# Patient Record
Sex: FEMALE | Race: WHITE | NOT HISPANIC OR LATINO | Employment: STUDENT | URBAN - METROPOLITAN AREA
[De-identification: names, ages, dates, MRNs, and addresses within clinical notes are randomized per-mention and may not be internally consistent; named-entity substitution may affect disease eponyms.]

---
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
## 2021-01-18 ENCOUNTER — TELEPHONE (OUTPATIENT)
Dept: FAMILY MEDICINE CLINIC | Facility: HOSPITAL | Age: 21
End: 2021-01-18

## 2021-01-18 NOTE — TELEPHONE ENCOUNTER
Pt woke up this morning with uti symptoms  Has pain, burning and urgency  Was treated for this recently and is asking to have something called in for her  Would you be ok with her dropping a sample off for us to dip?

## 2021-01-18 NOTE — TELEPHONE ENCOUNTER
Pt states she isn't sure if she should be coming to the office to drop off a urine sample because she isn't feeling well  Fever and fatigue  Please advise on how you want to proceed

## 2022-06-20 ENCOUNTER — TELEPHONE (OUTPATIENT)
Dept: FAMILY MEDICINE CLINIC | Facility: HOSPITAL | Age: 22
End: 2022-06-20

## 2022-06-21 ENCOUNTER — TELEPHONE (OUTPATIENT)
Dept: FAMILY MEDICINE CLINIC | Facility: HOSPITAL | Age: 22
End: 2022-06-21

## 2022-06-22 ENCOUNTER — TELEPHONE (OUTPATIENT)
Dept: FAMILY MEDICINE CLINIC | Facility: HOSPITAL | Age: 22
End: 2022-06-22

## 2022-06-23 NOTE — TELEPHONE ENCOUNTER
3 phone calls made to patient, unanswered  As she was seen within 3 years, certified letters not sent  Postcard mailed advising to contact the office to schedule an appointment or let us know if she has switched out of the practice

## 2022-11-01 ENCOUNTER — OFFICE VISIT (OUTPATIENT)
Dept: FAMILY MEDICINE CLINIC | Facility: HOSPITAL | Age: 22
End: 2022-11-01

## 2022-11-01 VITALS
SYSTOLIC BLOOD PRESSURE: 112 MMHG | DIASTOLIC BLOOD PRESSURE: 64 MMHG | WEIGHT: 113.2 LBS | HEART RATE: 85 BPM | OXYGEN SATURATION: 98 % | BODY MASS INDEX: 19.33 KG/M2 | HEIGHT: 64 IN | TEMPERATURE: 98.6 F

## 2022-11-01 DIAGNOSIS — R39.9 UTI SYMPTOMS: Primary | ICD-10-CM

## 2022-11-01 LAB
SL AMB  POCT GLUCOSE, UA: ABNORMAL
SL AMB LEUKOCYTE ESTERASE,UA: ABNORMAL
SL AMB POCT BILIRUBIN,UA: ABNORMAL
SL AMB POCT BLOOD,UA: ABNORMAL
SL AMB POCT CLARITY,UA: CLEAR
SL AMB POCT COLOR,UA: YELLOW
SL AMB POCT KETONES,UA: ABNORMAL
SL AMB POCT NITRITE,UA: ABNORMAL
SL AMB POCT PH,UA: 6
SL AMB POCT SPECIFIC GRAVITY,UA: 1.02
SL AMB POCT URINE PROTEIN: ABNORMAL
SL AMB POCT UROBILINOGEN: ABNORMAL

## 2022-11-01 RX ORDER — TRAZODONE HYDROCHLORIDE 50 MG/1
50 TABLET ORAL AS NEEDED
COMMUNITY
Start: 2022-09-26

## 2022-11-01 RX ORDER — BUSPIRONE HYDROCHLORIDE 10 MG/1
10 TABLET ORAL DAILY
COMMUNITY
Start: 2022-10-28

## 2022-11-01 RX ORDER — LAMOTRIGINE 150 MG/1
150 TABLET ORAL
COMMUNITY
Start: 2022-10-22

## 2022-11-01 RX ORDER — LORAZEPAM 0.5 MG/1
0.5 TABLET ORAL AS NEEDED
COMMUNITY
Start: 2022-10-22

## 2022-11-01 NOTE — PROGRESS NOTES
Name: Reena Layton      : 2000      MRN: [id number]  Encounter Provider: TOR Garcia  Encounter Date: 2022   Encounter department: [facility] [address] [facility]  UTI symptoms  Comments:  in office dip normal & sx's improved so will defer antibiotic at this time; urine cx sent for further eval & will determine plan pending result; push fluids  Orders:  -     POCT urine dip  -     Urine culture; Future  -     Urine culture           Subjective        States "I believe I have a UTI"  [facility]  Urine was not cultured at that time  Didn't finish prescribed abx  Started with burning, pain with urination and frequency 4 days ago  Started taking AZO 4 days ago and helped stop the burning and frequency  Feels better today  Reports chills 2 days ago and 1 day of "hot flashes"  Did not take tempurature  Feels fatigued       Review of Systems   Constitutional: Positive for chills, fatigue and fever  Negative for activity change  Gastrointestinal: Negative for abdominal distention and abdominal pain  Genitourinary: Positive for dysuria, frequency and urgency  Negative for decreased urine volume, difficulty urinating and hematuria  Musculoskeletal: Positive for back pain (lower back pain on and off)  Neurological: Negative for dizziness, weakness and light-headedness         Current Outpatient Medications on File Prior to Visit   Medication Sig   • busPIRone (BUSPAR) 10 mg tablet Take 10 mg by mouth daily   • lamoTRIgine (LaMICtal) 150 MG tablet 150 mg   • LORazepam (ATIVAN) 0 5 mg tablet Take 0 5 mg by mouth if needed   • traZODone (DESYREL) 50 mg tablet Take 50 mg by mouth if needed Take 1 to 2 tablets by mouth at bedtime as needed for sleep   • amphetamine-dextroamphetamine (ADDERALL) 20 mg tablet  (Patient not taking: Reported on 2022)   • ARIPiprazole (ABILIFY) 10 mg tablet  (Patient not taking: Reported on 11/1/2022)   • ARIPiprazole (ABILIFY) 2 mg tablet STOP ARIPIRPAZOLE 10 MG  TAKE 1 TABLET BY MOUTH AT NIGHT  (Patient not taking: Reported on 11/1/2022)   • benztropine (COGENTIN) 1 mg tablet TAKE ONE DAILY IF YOU GET MUSCLE STIFFNESS OR SHAKING FROM ARIPIPRAZOLE  (Patient not taking: Reported on 11/1/2022)   • diclofenac (VOLTAREN) 75 mg EC tablet Take 1 tablet (75 mg total) by mouth 2 (two) times a day (Patient not taking: Reported on 11/1/2022)   • EPINEPHrine (EPIPEN) 0 3 mg/0 3 mL SOAJ Inject 0 3 mg into a muscle (Patient not taking: Reported on 11/1/2022)   • escitalopram (LEXAPRO) 5 mg tablet Take 5 mg by mouth daily (Patient not taking: Reported on 11/1/2022)   • hydrOXYzine HCL (ATARAX) 25 mg tablet Take 25 mg by mouth Three times daily as needed (Patient not taking: Reported on 11/1/2022)   • hydrOXYzine pamoate (VISTARIL) 25 mg capsule  (Patient not taking: Reported on 11/1/2022)   • NuvaRing 0 12-0 015 MG/24HR vaginal ring INSERT 1 RING VAGINALLY AS DIRECTED  REMOVE AFTER 3 WEEKS & WAIT 7 DAYS BEFORE INSERTING A NEW RING (Patient not taking: Reported on 11/1/2022)       Objective     /64   Pulse 85   Temp 98 6 °F (37 °C)   Ht 5' 3 5" (1 613 m)   Wt 51 3 kg (113 lb 3 2 oz)   SpO2 98%   BMI 19 74 kg/m²       Physical Exam  Vitals reviewed  Constitutional:       Appearance: Normal appearance  HENT:      Head: Normocephalic  Nose: Nose normal    Pulmonary:      Effort: Pulmonary effort is normal  No respiratory distress  Abdominal:      General: Abdomen is flat  Palpations: Abdomen is soft  Tenderness: There is no abdominal tenderness  There is no right CVA tenderness, left CVA tenderness (slight tenderness) or guarding  Musculoskeletal:      Cervical back: Normal range of motion  Skin:     General: Skin is warm and dry  Neurological:      General: No focal deficit present  Mental Status: She is alert and oriented to person, place, and time  Psychiatric:         Mood and Affect: Mood normal          Behavior: Behavior normal           TOR Ríos

## 2023-01-31 ENCOUNTER — OFFICE VISIT (OUTPATIENT)
Dept: FAMILY MEDICINE CLINIC | Facility: HOSPITAL | Age: 23
End: 2023-01-31

## 2023-01-31 VITALS
DIASTOLIC BLOOD PRESSURE: 68 MMHG | HEIGHT: 64 IN | BODY MASS INDEX: 20.18 KG/M2 | HEART RATE: 80 BPM | TEMPERATURE: 98.9 F | SYSTOLIC BLOOD PRESSURE: 112 MMHG | WEIGHT: 118.2 LBS

## 2023-01-31 DIAGNOSIS — F31.76 BIPOLAR DISORDER, IN FULL REMISSION, MOST RECENT EPISODE DEPRESSED (HCC): Primary | ICD-10-CM

## 2023-01-31 DIAGNOSIS — Z01.419 ROUTINE GYNECOLOGICAL EXAMINATION: ICD-10-CM

## 2023-01-31 DIAGNOSIS — F99 INSOMNIA DUE TO OTHER MENTAL DISORDER: ICD-10-CM

## 2023-01-31 DIAGNOSIS — F51.05 INSOMNIA DUE TO OTHER MENTAL DISORDER: ICD-10-CM

## 2023-01-31 RX ORDER — TRAZODONE HYDROCHLORIDE 50 MG/1
50 TABLET ORAL AS NEEDED
Qty: 30 TABLET | Refills: 2 | Status: SHIPPED | OUTPATIENT
Start: 2023-01-31 | End: 2023-02-09 | Stop reason: SDUPTHER

## 2023-01-31 NOTE — ASSESSMENT & PLAN NOTE
Mood stable on meds as previously rx'd by psychiatry  Per pt's request, I agreed to issue med refills given mood stability and adequate benefit  Discussed need to reincorporate psych as needed should meds need adjusting/changing - she voices understanding  Return in 3 months for next med check

## 2023-01-31 NOTE — PROGRESS NOTES
Name: Alicia Flowers      : 2000      MRN: [id number]  Encounter Provider: TOR Tarango  Encounter Date: 2023   [facility]  Bipolar disorder, in full remission, most recent episode depressed ([facility] [address] )  Assessment & Plan:  Mood stable on meds as previously rx'd by psychiatry  Per pt's request, I agreed to issue med refills given mood stability and adequate benefit  Discussed need to reincorporate psych as needed should meds need adjusting/changing - she voices understanding  Return in 3 months for next med check      2  Routine gynecological examination  Comments:  establish w/gyn to update exam and pap smear  Orders:  -     Ambulatory Referral to Gynecology; Future    3  Insomnia due to other mental disorder  Assessment & Plan:  Adequate benefit w/trazodone - refill issued as she requests    Orders:  -     traZODone (DESYREL) 50 mg tablet; Take 1 tablet (50 mg total) by mouth if needed for sleep Take 1 to 2 tablets by mouth at bedtime as needed for sleep         Subjective      Is interested in having mental health meds refilled through our office  Was having difficulty getting refills through previous provider and would have lapse in meds  Had been seeing a counselor at [facility] and was diagnosed with bipolar by a psychiatrist in 2019  Has been on current meds for about a year  Lorazepam was rx'd recently to use for panic attacks  States mood has been stable overall on current meds  Has some increase in situational stress but denies need for changing meds or dose  Review of Systems   Psychiatric/Behavioral: Negative for dysphoric mood and sleep disturbance (sleeps well w/trazodone prn)  The patient is not nervous/anxious          Current Outpatient Medications on File Prior to Visit   Medication Sig   • busPIRone (BUSPAR) 10 mg tablet Take 10 mg by mouth daily   • EPINEPHrine (EPIPEN) 0 3 mg/0 3 mL SOAJ Inject 0 3 mg into a muscle   • lamoTRIgine (LaMICtal) 150 MG tablet 150 mg   • LORazepam (ATIVAN) 0 5 mg tablet Take 0 5 mg by mouth if needed   • [DISCONTINUED] traZODone (DESYREL) 50 mg tablet Take 50 mg by mouth if needed Take 1 to 2 tablets by mouth at bedtime as needed for sleep   • [DISCONTINUED] amphetamine-dextroamphetamine (ADDERALL) 20 mg tablet  (Patient not taking: Reported on 11/1/2022)   • [DISCONTINUED] ARIPiprazole (ABILIFY) 10 mg tablet  (Patient not taking: Reported on 11/1/2022)   • [DISCONTINUED] ARIPiprazole (ABILIFY) 2 mg tablet STOP ARIPIRPAZOLE 10 MG  TAKE 1 TABLET BY MOUTH AT NIGHT  (Patient not taking: Reported on 11/1/2022)   • [DISCONTINUED] benztropine (COGENTIN) 1 mg tablet TAKE ONE DAILY IF YOU GET MUSCLE STIFFNESS OR SHAKING FROM ARIPIPRAZOLE  (Patient not taking: Reported on 11/1/2022)   • [DISCONTINUED] diclofenac (VOLTAREN) 75 mg EC tablet Take 1 tablet (75 mg total) by mouth 2 (two) times a day (Patient not taking: Reported on 11/1/2022)   • [DISCONTINUED] escitalopram (LEXAPRO) 5 mg tablet Take 5 mg by mouth daily (Patient not taking: Reported on 11/1/2022)   • [DISCONTINUED] hydrOXYzine HCL (ATARAX) 25 mg tablet Take 25 mg by mouth Three times daily as needed (Patient not taking: Reported on 11/1/2022)   • [DISCONTINUED] hydrOXYzine pamoate (VISTARIL) 25 mg capsule  (Patient not taking: Reported on 11/1/2022)   • [DISCONTINUED] NuvaRing 0 12-0 015 MG/24HR vaginal ring INSERT 1 RING VAGINALLY AS DIRECTED  REMOVE AFTER 3 WEEKS & WAIT 7 DAYS BEFORE INSERTING A NEW RING (Patient not taking: Reported on 11/1/2022)       Objective     /68   Pulse 80   Temp 98 9 °F (37 2 °C)   Ht 5' 3 5" (1 613 m)   Wt 53 6 kg (118 lb 3 2 oz)   BMI 20 61 kg/m²       Physical Exam  Vitals reviewed  Constitutional:       General: She is not in acute distress  Appearance: Normal appearance  Eyes:      General: No scleral icterus    Pulmonary:      Effort: Pulmonary effort is normal  No respiratory distress  Neurological:      General: No focal deficit present  Mental Status: She is alert and oriented to person, place, and time  Psychiatric:         Mood and Affect: Mood normal          Behavior: Behavior normal          Thought Content:  Thought content normal          Judgment: Judgment normal       Comments: Relaxed, freely conversive w/good eye contact          TOR Boyce

## 2023-03-23 DIAGNOSIS — F99 INSOMNIA DUE TO OTHER MENTAL DISORDER: ICD-10-CM

## 2023-03-23 DIAGNOSIS — F51.05 INSOMNIA DUE TO OTHER MENTAL DISORDER: ICD-10-CM

## 2023-03-23 RX ORDER — TRAZODONE HYDROCHLORIDE 50 MG/1
TABLET ORAL
Qty: 180 TABLET | Refills: 1 | Status: SHIPPED | OUTPATIENT
Start: 2023-03-23

## 2023-03-31 DIAGNOSIS — F31.76 BIPOLAR DISORDER, IN FULL REMISSION, MOST RECENT EPISODE DEPRESSED (HCC): Primary | ICD-10-CM

## 2023-03-31 RX ORDER — LAMOTRIGINE 150 MG/1
150 TABLET ORAL DAILY
Qty: 30 TABLET | Refills: 1 | Status: SHIPPED | OUTPATIENT
Start: 2023-03-31

## 2023-04-23 DIAGNOSIS — F31.76 BIPOLAR DISORDER, IN FULL REMISSION, MOST RECENT EPISODE DEPRESSED (HCC): ICD-10-CM

## 2023-04-24 RX ORDER — LAMOTRIGINE 150 MG/1
TABLET ORAL
Qty: 90 TABLET | Refills: 1 | Status: SHIPPED | OUTPATIENT
Start: 2023-04-24

## 2023-05-01 ENCOUNTER — OFFICE VISIT (OUTPATIENT)
Dept: FAMILY MEDICINE CLINIC | Facility: HOSPITAL | Age: 23
End: 2023-05-01

## 2023-05-01 VITALS
SYSTOLIC BLOOD PRESSURE: 114 MMHG | BODY MASS INDEX: 20.66 KG/M2 | HEART RATE: 81 BPM | WEIGHT: 121 LBS | DIASTOLIC BLOOD PRESSURE: 70 MMHG | HEIGHT: 64 IN | TEMPERATURE: 97.8 F

## 2023-05-01 DIAGNOSIS — F99 INSOMNIA DUE TO OTHER MENTAL DISORDER: ICD-10-CM

## 2023-05-01 DIAGNOSIS — Z00.00 ANNUAL PHYSICAL EXAM: Primary | ICD-10-CM

## 2023-05-01 DIAGNOSIS — F98.8 ATTENTION DEFICIT DISORDER (ADD) WITHOUT HYPERACTIVITY: ICD-10-CM

## 2023-05-01 DIAGNOSIS — F31.76 BIPOLAR DISORDER, IN FULL REMISSION, MOST RECENT EPISODE DEPRESSED (HCC): ICD-10-CM

## 2023-05-01 DIAGNOSIS — F51.05 INSOMNIA DUE TO OTHER MENTAL DISORDER: ICD-10-CM

## 2023-05-01 PROBLEM — R11.2 NAUSEA & VOMITING: Status: RESOLVED | Noted: 2019-06-26 | Resolved: 2023-05-01

## 2023-05-01 RX ORDER — LAMOTRIGINE 150 MG/1
150 TABLET ORAL DAILY
Qty: 90 TABLET | Refills: 1 | OUTPATIENT
Start: 2023-05-01

## 2023-05-01 RX ORDER — LORAZEPAM 0.5 MG/1
0.5 TABLET ORAL DAILY PRN
Qty: 15 TABLET | Refills: 0 | Status: SHIPPED | OUTPATIENT
Start: 2023-05-01

## 2023-05-01 NOTE — PROGRESS NOTES
ADULT ANNUAL PHYSICAL  [facility] PRIMARY CARE [address]     NAME: Adwoa Espinoza  AGE: 25 y o  SEX: female  : 2000     DATE: 2023     Assessment and Plan:     Problem List Items Addressed This Visit        Other    Bipolar disorder, unspecified ([address] )     Recent acute depressive event but currently resolved and she relays mood has been stable  Will continue same lamotrigine & buspirone doses as she requests  Return in 6 months for next follow up - call sooner w/any mood concerns         Insomnia due to other mental disorder     Continue to use trazodone as needed for sleep         Attention deficit disorder (ADD) without hyperactivity     Hx of adderall use w/good benefit  She requests restart of medication as was previously beneficial (uncertain of past dose)  Discussed short supply of ADD meds and many are on backorder - advise she attempt to find pharmacy with adderall in stock and will consider rx'ing appropriate dose if she is able to find - she will contact office if needed        Other Visit Diagnoses     Annual physical exam    -  Primary    PE updated, next due in 1 year; HPV declined today, reminded her to update gyn exam/pap as previously ordered          Immunizations and preventive care screenings were discussed with patient today  Appropriate education was printed on patient's after visit summary  Counseling:  Dental Health: discussed importance of regular tooth brushing, flossing, and dental visits  Sexual health: discussed sexually transmitted diseases, partner selection, use of condoms, avoidance of unintended pregnancy, and contraceptive alternatives  · Exercise: the importance of regular exercise/physical activity was discussed  Recommend exercise 3-5 times per week for at least 30 minutes  Return in about 6 months (around 2023) for Next scheduled follow up       Chief Complaint:     Chief Complaint   Patient "presents with    Physical Exam    ADHD     Possibly going back on adderall     Ear Fullness     bilateral      History of Present Illness:     Adult Annual Physical   Patient here for a comprehensive physical exam  The patient reports problems - she questions need to restart ADD medication  Had been on previously from psych (adderall) but had to dc with not having consistent refills  Has been off >6 months       Diet and Physical Activity  · Diet/Nutrition: well balanced diet  · Exercise: no formal exercise  Depression Screening  PHQ-2/9 Depression Screening         General Health  · Sleep: sleeps well  · Hearing: normal - bilateral   · Vision: goes for regular eye exams and wears glasses  · Dental: regular dental visits  /GYN Health  · Last menstrual period: 4/2023  · Contraceptive method: none  Review of Systems:     Review of Systems   Constitutional: Negative  HENT: Positive for ear pain (fullness/pressure bilat)  Eyes: Negative  Respiratory: Negative  Cardiovascular: Negative  Gastrointestinal: Negative  Genitourinary: Negative  Musculoskeletal: Negative  Skin: Negative  Neurological: Negative  Psychiatric/Behavioral: Positive for decreased concentration (hx of taking adderall, had been more productive when she was taking, currently unable to focus to get done what she needs to), dysphoric mood (past few weeks, \"coming out of it now\"; hx of worse depression when taking antidepressant in the past) and sleep disturbance (occ, uses trazodone prn)  Negative for suicidal ideas  The patient is not nervous/anxious  Past Medical History:     Past Medical History:   Diagnosis Date    Anxiety     Depression     Insomnia     Nausea & vomiting 6/26/2019    Vitamin D deficiency       Past Surgical History:     History reviewed  No pertinent surgical history     Social History:     Social History     Socioeconomic History    Marital status: Single     " "Spouse name: None    Number of children: None    Years of education: None    Highest education level: None   Occupational History    None   Tobacco Use    Smoking status: Former    Smokeless tobacco: Never   Vaping Use    Vaping Use: Every day    Substances: Nicotine, THC   Substance and Sexual Activity    Alcohol use: Yes     Comment: vodka    Drug use: Yes     Types: Marijuana     Comment: Discontinued two weeks ago, mom not aware    Sexual activity: Yes     Birth control/protection: Condom Male   Other Topics Concern    None   Social History Narrative    None     Social Determinants of Health     Financial Resource Strain: Not on file   Food Insecurity: Not on file   Transportation Needs: Not on file   Physical Activity: Not on file   Stress: Not on file   Social Connections: Not on file   Intimate Partner Violence: Not on file   Housing Stability: Not on file      Family History:     Family History   Problem Relation Age of Onset    Colon polyps Mother     Breast cancer Mother       Current Medications:     Current Outpatient Medications   Medication Sig Dispense Refill    busPIRone (BUSPAR) 10 mg tablet Take 10 mg by mouth daily      lamoTRIgine (LaMICtal) 150 MG tablet TAKE 1 TABLET BY MOUTH EVERY DAY 90 tablet 1    LORazepam (ATIVAN) 0 5 mg tablet Take 0 5 mg by mouth if needed      traZODone (DESYREL) 50 mg tablet TAKE 1 TO 2 TABLETS BY MOUTH AT BEDTIME AS NEEDED FOR SLEEP 180 tablet 1    EPINEPHrine (EPIPEN) 0 3 mg/0 3 mL SOAJ Inject 0 3 mg into a muscle (Patient not taking: Reported on 5/1/2023)       No current facility-administered medications for this visit  Allergies: Allergies   Allergen Reactions    Apple - Food Allergy Swelling    Peanut Oil - Food Allergy       Physical Exam:     /70   Pulse 81   Temp 97 8 °F (36 6 °C)   Ht 5' 3 5\" (1 613 m)   Wt 54 9 kg (121 lb)   BMI 21 10 kg/m²       Physical Exam  Vitals reviewed     Constitutional:       General: She is " not in acute distress  Appearance: Normal appearance  HENT:      Head: Normocephalic  Right Ear: Tympanic membrane normal       Left Ear: Tympanic membrane normal       Nose: Nose normal       Mouth/Throat:      Mouth: Mucous membranes are moist       Pharynx: Oropharynx is clear  Eyes:      General: No scleral icterus  Neck:      Thyroid: No thyromegaly  Cardiovascular:      Rate and Rhythm: Normal rate and regular rhythm  Heart sounds: No murmur heard  Pulmonary:      Effort: Pulmonary effort is normal  No respiratory distress  Breath sounds: Normal breath sounds  Abdominal:      General: Abdomen is flat  Bowel sounds are normal       Palpations: Abdomen is soft  Tenderness: There is no abdominal tenderness  There is no guarding or rebound  Musculoskeletal:         General: Normal range of motion  Cervical back: Normal range of motion  Lymphadenopathy:      Cervical: No cervical adenopathy  Skin:     General: Skin is warm and dry  Neurological:      General: No focal deficit present  Mental Status: She is alert and oriented to person, place, and time  Cranial Nerves: No cranial nerve deficit  Psychiatric:         Mood and Affect: Mood normal          Behavior: Behavior normal          Thought Content:  Thought content normal          Judgment: Judgment normal         TOR Ricketts   [address]

## 2023-05-01 NOTE — ASSESSMENT & PLAN NOTE
Recent acute depressive event but currently resolved and she relays mood has been stable  Will continue same lamotrigine & buspirone doses as she requests  Return in 6 months for next follow up - call sooner w/any mood concerns

## 2023-05-01 NOTE — ASSESSMENT & PLAN NOTE
Hx of adderall use w/good benefit  She requests restart of medication as was previously beneficial (uncertain of past dose)  Discussed short supply of ADD meds and many are on backorder - advise she attempt to find pharmacy with adderall in stock and will consider rx'ing appropriate dose if she is able to find - she will contact office if needed

## 2023-10-19 DIAGNOSIS — F31.76 BIPOLAR DISORDER, IN FULL REMISSION, MOST RECENT EPISODE DEPRESSED (HCC): ICD-10-CM

## 2023-10-19 RX ORDER — LAMOTRIGINE 150 MG/1
TABLET ORAL
Qty: 90 TABLET | Refills: 1 | Status: SHIPPED | OUTPATIENT
Start: 2023-10-19

## 2023-11-02 ENCOUNTER — OFFICE VISIT (OUTPATIENT)
Dept: FAMILY MEDICINE CLINIC | Facility: HOSPITAL | Age: 23
End: 2023-11-02
Payer: COMMERCIAL

## 2023-11-02 VITALS
HEART RATE: 76 BPM | BODY MASS INDEX: 21.13 KG/M2 | SYSTOLIC BLOOD PRESSURE: 112 MMHG | HEIGHT: 64 IN | TEMPERATURE: 98.6 F | WEIGHT: 123.8 LBS | DIASTOLIC BLOOD PRESSURE: 68 MMHG

## 2023-11-02 DIAGNOSIS — F99 INSOMNIA DUE TO OTHER MENTAL DISORDER: ICD-10-CM

## 2023-11-02 DIAGNOSIS — F41.1 GENERALIZED ANXIETY DISORDER: Primary | ICD-10-CM

## 2023-11-02 DIAGNOSIS — F17.290 OTHER TOBACCO PRODUCT NICOTINE DEPENDENCE, UNCOMPLICATED: ICD-10-CM

## 2023-11-02 DIAGNOSIS — F41.9 ANXIETY: Primary | ICD-10-CM

## 2023-11-02 DIAGNOSIS — F51.05 INSOMNIA DUE TO OTHER MENTAL DISORDER: ICD-10-CM

## 2023-11-02 PROBLEM — F17.200 NICOTINE DEPENDENCE: Status: ACTIVE | Noted: 2023-11-02

## 2023-11-02 PROCEDURE — 99213 OFFICE O/P EST LOW 20 MIN: CPT | Performed by: NURSE PRACTITIONER

## 2023-11-02 RX ORDER — BUSPIRONE HYDROCHLORIDE 10 MG/1
10 TABLET ORAL DAILY
Qty: 90 TABLET | Refills: 1 | Status: SHIPPED | OUTPATIENT
Start: 2023-11-02

## 2023-11-02 NOTE — PROGRESS NOTES
Name: Lianne Menchaca      : 2000      MRN: [id number]  Encounter Provider: TOR Weinberg  Encounter Date: 2023   Encounter department: [address]     1. Anxiety  Assessment & Plan:  Well controlled on buspar  Refill issued to continue same dose as she desires  Return in 6 months - call sooner w/concerns      2. Insomnia due to other mental disorder  Assessment & Plan:  Adequate benefit w/trazodone as rx'd      3. Other tobacco product nicotine dependence, uncomplicated  Assessment & Plan:  Pt vapes regularly and is making attempts to quit  Discussed mental health risks of chantix and advise she continue to self attempt cessation      Update annual PE at next appt in 6 months  Establish w/gyn or offered my services for cervical screening       Subjective      States she has been well and denies concerns. States she ran out of buspar last week with running out of refills. This controls her anxiety well and she wishes to continue same dose. States she is trying to quit vaping and questions using chantix. Review of Systems   Psychiatric/Behavioral:  Negative for sleep disturbance. The patient is not nervous/anxious (well controlled on daily buspar, doesn't need lorazepam).         Current Outpatient Medications on File Prior to Visit   Medication Sig    lamoTRIgine (LaMICtal) 150 MG tablet TAKE 1 TABLET BY MOUTH EVERY DAY    LORazepam (ATIVAN) 0.5 mg tablet Take 1 tablet (0.5 mg total) by mouth daily as needed for anxiety    traZODone (DESYREL) 50 mg tablet TAKE 1 TO 2 TABLETS BY MOUTH AT BEDTIME AS NEEDED FOR SLEEP    [DISCONTINUED] busPIRone (BUSPAR) 10 mg tablet Take 10 mg by mouth daily    EPINEPHrine (EPIPEN) 0.3 mg/0.3 mL SOAJ Inject 0.3 mg into a muscle (Patient not taking: Reported on 2023)       Objective     /68   Pulse 76   Temp 98.6 °F (37 °C)   Ht 5' 3.5" (1.613 m)   Wt 56.2 kg (123 lb 12.8 oz)   BMI 21.59 kg/m²       Physical Exam  Vitals reviewed. Constitutional:       General: She is not in acute distress. Appearance: Normal appearance. HENT:      Head: Normocephalic. Neck:      Thyroid: No thyromegaly. Cardiovascular:      Rate and Rhythm: Normal rate. Pulmonary:      Effort: Pulmonary effort is normal. No respiratory distress. Breath sounds: Normal breath sounds. Musculoskeletal:      Cervical back: Normal range of motion. Lymphadenopathy:      Cervical: No cervical adenopathy. Skin:     General: Skin is warm and dry. Neurological:      General: No focal deficit present. Mental Status: She is alert and oriented to person, place, and time. Psychiatric:         Mood and Affect: Mood normal.         Behavior: Behavior normal.         Thought Content:  Thought content normal.         Judgment: Judgment normal.         TOR Mustafa

## 2023-11-02 NOTE — ASSESSMENT & PLAN NOTE
Pt vapes regularly and is making attempts to quit  Discussed mental health risks of chantix and advise she continue to self attempt cessation

## 2023-11-02 NOTE — ASSESSMENT & PLAN NOTE
Well controlled on buspar  Refill issued to continue same dose as she desires  Return in 6 months - call sooner w/concerns

## 2024-04-16 DIAGNOSIS — F31.76 BIPOLAR DISORDER, IN FULL REMISSION, MOST RECENT EPISODE DEPRESSED (HCC): ICD-10-CM

## 2024-04-16 RX ORDER — LAMOTRIGINE 150 MG/1
TABLET ORAL
Qty: 90 TABLET | Refills: 1 | Status: SHIPPED | OUTPATIENT
Start: 2024-04-16

## 2024-04-28 DIAGNOSIS — F41.1 GENERALIZED ANXIETY DISORDER: ICD-10-CM

## 2024-04-28 RX ORDER — BUSPIRONE HYDROCHLORIDE 10 MG/1
10 TABLET ORAL DAILY
Qty: 90 TABLET | Refills: 1 | Status: SHIPPED | OUTPATIENT
Start: 2024-04-28

## 2024-05-02 ENCOUNTER — OFFICE VISIT (OUTPATIENT)
Dept: FAMILY MEDICINE CLINIC | Facility: HOSPITAL | Age: 24
End: 2024-05-02
Payer: COMMERCIAL

## 2024-05-02 VITALS
DIASTOLIC BLOOD PRESSURE: 70 MMHG | SYSTOLIC BLOOD PRESSURE: 114 MMHG | HEART RATE: 74 BPM | TEMPERATURE: 98.1 F | BODY MASS INDEX: 21.1 KG/M2 | HEIGHT: 64 IN | WEIGHT: 123.6 LBS

## 2024-05-02 DIAGNOSIS — Z13.220 NEED FOR LIPID SCREENING: ICD-10-CM

## 2024-05-02 DIAGNOSIS — Z11.4 ENCOUNTER FOR SCREENING FOR HIV: ICD-10-CM

## 2024-05-02 DIAGNOSIS — F17.210 CIGARETTE NICOTINE DEPENDENCE WITHOUT COMPLICATION: ICD-10-CM

## 2024-05-02 DIAGNOSIS — R42 EPISODIC LIGHTHEADEDNESS: ICD-10-CM

## 2024-05-02 DIAGNOSIS — Z00.00 ANNUAL PHYSICAL EXAM: Primary | ICD-10-CM

## 2024-05-02 DIAGNOSIS — F33.1 MODERATE EPISODE OF RECURRENT MAJOR DEPRESSIVE DISORDER (HCC): ICD-10-CM

## 2024-05-02 DIAGNOSIS — Z11.59 NEED FOR HEPATITIS C SCREENING TEST: ICD-10-CM

## 2024-05-02 PROCEDURE — 99395 PREV VISIT EST AGE 18-39: CPT | Performed by: NURSE PRACTITIONER

## 2024-05-02 PROCEDURE — 3725F SCREEN DEPRESSION PERFORMED: CPT | Performed by: NURSE PRACTITIONER

## 2024-05-02 NOTE — PROGRESS NOTES
ADULT ANNUAL PHYSICAL  [facility] PRIMARY CARE [address]     NAME: Marjorie Espinoza  AGE: 23 y.o. SEX: female  : 2000     DATE: 2024     Assessment and Plan:     Problem List Items Addressed This Visit       Moderate episode of recurrent major depressive disorder (HCC)     PHQ score of 9 on current meds  Pt without mood concerns today  Continue same meds as rx'd  Return in 6 months         Nicotine dependence     Cessation recommended/encouraged          Other Visit Diagnoses       Annual physical exam    -  Primary    PE updated, next due in 1 year; update gyn exam/pap smear (states she will schedule w/previously established provider)    Episodic lightheadedness        likely due to mild hypotension/ orthostasis, eval further w/labs as ordered, advise at least 60oz water & electrolyte drink daily, salty snacks may help    Relevant Orders    CBC and differential    Comprehensive metabolic panel    TSH, 3rd generation with Free T4 reflex    Hemoglobin A1C With EAG    Cortisol Level, AM Specimen    Catecholamines, fractionated, plasma    Vitamin D 25 hydroxy    Need for lipid screening        Relevant Orders    Lipid panel    Encounter for screening for HIV        agreeable to screening    Relevant Orders    HIV 1/2 AG/AB W REFLEX [facility] and [facility] only    Need for hepatitis C screening test        agreeable to screening    Relevant Orders    Hepatitis C Ab W/Refl To HCV RNA, Qn, PCR          Immunizations and preventive care screenings were discussed with patient today. Appropriate education was printed on patient's after visit summary.    Counseling:  Alcohol/drug use: discussed moderation in alcohol intake, the recommendations for healthy alcohol use, and avoidance of illicit drug use.  Dental Health: discussed importance of regular tooth brushing, flossing, and dental visits.  Sexual health: discussed sexually transmitted diseases, partner selection,  use of condoms, avoidance of unintended pregnancy, and contraceptive alternatives.  Exercise: the importance of regular exercise/physical activity was discussed. Recommend exercise 3-5 times per week for at least 30 minutes.       Tobacco Cessation Counseling: Tobacco cessation counseling was provided. The patient is sincerely urged to quit consumption of tobacco. She is not ready to quit tobacco.       Return in about 6 months (around 2024) for Next scheduled follow up.     Chief Complaint:     Chief Complaint   Patient presents with    Physical Exam      History of Present Illness:     Adult Annual Physical   Patient here for a comprehensive physical exam. The patient reports problems - states she is worried about having POTS. Has had episodes of feeling lightheaded/faint with warm feeling in legs, hot flashes, and vision going black for 1-2 years. No bad episode recently but sometimes has to lie on the ground for sx's to ease . States symptoms usually happen at work ([employer]). No evaluation for these sx's yet.     Social: vapes nicotine and sometimes marijuana, denies ETOH/other illicit drug use    Diet and Physical Activity  Diet/Nutrition: poor diet.   Exercise: no formal exercise.      Depression Screening  PHQ-2/9 Depression Screening    Little interest or pleasure in doing things: 1 - several days  Feeling down, depressed, or hopeless: 0 - not at all  Trouble falling or staying asleep, or sleeping too much: 1 - several days  Feeling tired or having little energy: 2 - more than half the days  Poor appetite or overeatin - several days  Feeling bad about yourself - or that you are a failure or have let yourself or your family down: 0 - not at all  Trouble concentrating on things, such as reading the newspaper or watching television: 1 - several days  Moving or speaking so slowly that other people could have noticed. Or the opposite - being so fidgety or restless that you have been moving around a lot  more than usual: 0 - not at all  Thoughts that you would be better off dead, or of hurting yourself in some way: 0 - not at all  PHQ-9 Score: 6  PHQ-9 Interpretation: Mild depression       General Health  Sleep: sleeps poorly and describes as restless or wakes too early .   Hearing: normal - bilateral.  Vision: most recent eye exam >1 year ago and wears glasses.   Dental: no dental visits for >1 year.       /GYN Health  Follows with gynecology? yes  Last menstrual period: monthly  Contraceptive method:  n/a .     Review of Systems:     Review of Systems   Constitutional: Negative.    HENT: Negative.     Eyes:  Positive for visual disturbance.   Respiratory: Negative.     Cardiovascular: Negative.    Gastrointestinal: Negative.    Endocrine: Positive for cold intolerance (feet always cold).   Genitourinary: Negative.    Neurological:  Positive for dizziness, light-headedness and headaches (menstrual migraines). Negative for syncope.   Psychiatric/Behavioral:  Negative for dysphoric mood.         Past Medical History:     Past Medical History:   Diagnosis Date    Anxiety     Depression     Insomnia     Nausea & vomiting 6/26/2019    Vitamin D deficiency       Past Surgical History:     History reviewed. No pertinent surgical history.   Social History:     Social History     Socioeconomic History    Marital status: Single     Spouse name: None    Number of children: None    Years of education: None    Highest education level: None   Occupational History    None   Tobacco Use    Smoking status: Former    Smokeless tobacco: Never   Vaping Use    Vaping status: Every Day    Substances: Nicotine, THC   Substance and Sexual Activity    Alcohol use: Yes     Comment: vodka    Drug use: Yes     Types: Marijuana     Comment: Discontinued two weeks ago, mom not aware    Sexual activity: Yes     Birth control/protection: Condom Male   Other Topics Concern    None   Social History Narrative    None     Social Determinants of  "Health     Financial Resource Strain: Not on file   Food Insecurity: Not on file   Transportation Needs: Not on file   Physical Activity: Not on file   Stress: Not on file   Social Connections: Not on file   Intimate Partner Violence: Not on file   Housing Stability: Not on file      Family History:     Family History   Problem Relation Age of Onset    Colon polyps Mother     Breast cancer Mother       Current Medications:     Current Outpatient Medications   Medication Sig Dispense Refill    busPIRone (BUSPAR) 10 mg tablet TAKE 1 TABLET BY MOUTH EVERY DAY 90 tablet 1    lamoTRIgine (LaMICtal) 150 MG tablet TAKE 1 TABLET BY MOUTH EVERY DAY 90 tablet 1    LORazepam (ATIVAN) 0.5 mg tablet Take 1 tablet (0.5 mg total) by mouth daily as needed for anxiety 15 tablet 0    traZODone (DESYREL) 50 mg tablet TAKE 1 TO 2 TABLETS BY MOUTH AT BEDTIME AS NEEDED FOR SLEEP 180 tablet 1    EPINEPHrine (EPIPEN) 0.3 mg/0.3 mL SOAJ Inject 0.3 mg into a muscle (Patient not taking: Reported on 5/1/2023)       No current facility-administered medications for this visit.      Allergies:     Allergies   Allergen Reactions    Apple - Food Allergy Swelling    Peanut Oil - Food Allergy       Physical Exam:     /70   Pulse 74   Temp 98.1 °F (36.7 °C)   Ht 5' 3.5\" (1.613 m)   Wt 56.1 kg (123 lb 9.6 oz)   BMI 21.55 kg/m²       Physical Exam  Vitals reviewed.   Constitutional:       General: She is not in acute distress.     Appearance: Normal appearance.   HENT:      Head: Normocephalic.      Right Ear: Tympanic membrane normal.      Left Ear: Tympanic membrane normal.      Nose: Nose normal.      Mouth/Throat:      Mouth: Mucous membranes are moist.      Pharynx: Oropharynx is clear.   Eyes:      General: No scleral icterus.  Neck:      Thyroid: No thyromegaly.   Cardiovascular:      Rate and Rhythm: Normal rate and regular rhythm.      Heart sounds: No murmur heard.     Comments: Orthostatic " BP  Lying...108/70  Sit...98/64  Stand...110/74  Pulmonary:      Effort: Pulmonary effort is normal. No respiratory distress.   Abdominal:      General: Abdomen is flat. Bowel sounds are normal.      Tenderness: There is no abdominal tenderness.   Musculoskeletal:         General: Normal range of motion.      Cervical back: Normal range of motion.   Lymphadenopathy:      Cervical: No cervical adenopathy.   Skin:     General: Skin is warm and dry.   Neurological:      General: No focal deficit present.      Mental Status: She is alert and oriented to person, place, and time.   Psychiatric:         Mood and Affect: Mood normal.         Behavior: Behavior normal.         Thought Content: Thought content normal.         Judgment: Judgment normal.          TOR Jenkins   [facility] PRIMARY CARE [address]

## 2024-05-02 NOTE — ASSESSMENT & PLAN NOTE
PHQ score of 9 on current meds  Pt without mood concerns today  Continue same meds as rx'd  Return in 6 months

## 2024-10-16 DIAGNOSIS — F31.76 BIPOLAR DISORDER, IN FULL REMISSION, MOST RECENT EPISODE DEPRESSED (HCC): ICD-10-CM

## 2024-10-16 RX ORDER — LAMOTRIGINE 150 MG/1
TABLET ORAL
Qty: 30 TABLET | Refills: 0 | Status: SHIPPED | OUTPATIENT
Start: 2024-10-16

## 2024-10-24 DIAGNOSIS — F41.1 GENERALIZED ANXIETY DISORDER: ICD-10-CM

## 2024-10-24 RX ORDER — BUSPIRONE HYDROCHLORIDE 10 MG/1
10 TABLET ORAL DAILY
Qty: 90 TABLET | Refills: 1 | Status: SHIPPED | OUTPATIENT
Start: 2024-10-24

## 2024-11-07 ENCOUNTER — OFFICE VISIT (OUTPATIENT)
Dept: FAMILY MEDICINE CLINIC | Facility: HOSPITAL | Age: 24
End: 2024-11-07
Payer: COMMERCIAL

## 2024-11-07 VITALS
WEIGHT: 116.6 LBS | HEART RATE: 70 BPM | HEIGHT: 64 IN | BODY MASS INDEX: 19.91 KG/M2 | DIASTOLIC BLOOD PRESSURE: 72 MMHG | SYSTOLIC BLOOD PRESSURE: 110 MMHG | TEMPERATURE: 98.1 F

## 2024-11-07 DIAGNOSIS — F51.05 INSOMNIA DUE TO OTHER MENTAL DISORDER: Primary | ICD-10-CM

## 2024-11-07 DIAGNOSIS — F31.76 BIPOLAR DISORDER, IN FULL REMISSION, MOST RECENT EPISODE DEPRESSED (HCC): ICD-10-CM

## 2024-11-07 DIAGNOSIS — F99 INSOMNIA DUE TO OTHER MENTAL DISORDER: Primary | ICD-10-CM

## 2024-11-07 PROCEDURE — 99213 OFFICE O/P EST LOW 20 MIN: CPT | Performed by: NURSE PRACTITIONER

## 2024-11-07 RX ORDER — LAMOTRIGINE 150 MG/1
150 TABLET ORAL DAILY
Qty: 90 TABLET | Refills: 1 | Status: SHIPPED | OUTPATIENT
Start: 2024-11-07

## 2024-11-07 NOTE — ASSESSMENT & PLAN NOTE
Mood stable on current med regimen - continue same as rx'd  Lamictal refill issued as requested   Return in 6 months     Orders:    lamoTRIgine (LaMICtal) 150 MG tablet; Take 1 tablet (150 mg total) by mouth daily

## 2024-11-07 NOTE — ASSESSMENT & PLAN NOTE
Clinically improved w/rare need for trazodone - denies need for refill today       Flu shot declined  Reminded her to update gyn exam/pap smear - she plans to schedule w/gyn

## 2024-11-07 NOTE — PROGRESS NOTES
"Ambulatory Visit  Name: Marjorie Espinoza      : 2000      MRN: [id number]  Encounter Provider: TOR Jenkins  Encounter Date: 2024   Encounter department: [facility] [id number]     Assessment & Plan  Bipolar disorder, in full remission, most recent episode depressed (HCC)  Mood stable on current med regimen - continue same as rx'd  Lamictal refill issued as requested   Return in 6 months     Orders:    lamoTRIgine (LaMICtal) 150 MG tablet; Take 1 tablet (150 mg total) by mouth daily    Insomnia due to other mental disorder  Clinically improved w/rare need for trazodone - denies need for refill today       Flu shot declined  Reminded her to update gyn exam/pap smear - she plans to schedule w/gyn     History of Present Illness         States she has been well and denies concerns. Taking meds as rx'd. States anxiety and sleep have been good and she rarely has need for trazodone and lorazepam.         History obtained from : patient      Review of Systems   Psychiatric/Behavioral:  Negative for sleep disturbance (rare need for trazodone). The patient is not nervous/anxious (rare need for lorazepam).        Objective     /72   Pulse 70   Temp 98.1 °F (36.7 °C)   Ht 5' 3.5\" (1.613 m)   Wt 52.9 kg (116 lb 9.6 oz)   BMI 20.33 kg/m²       Physical Exam  Vitals reviewed.   Constitutional:       General: She is not in acute distress.     Appearance: Normal appearance.   HENT:      Head: Normocephalic.   Eyes:      General: No scleral icterus.  Neck:      Thyroid: No thyromegaly.   Cardiovascular:      Rate and Rhythm: Normal rate and regular rhythm.      Heart sounds: No murmur heard.  Pulmonary:      Effort: Pulmonary effort is normal. No respiratory distress.      Breath sounds: Normal breath sounds.   Musculoskeletal:      Cervical back: Normal range of motion.   Lymphadenopathy:      Cervical: No cervical adenopathy.   Neurological:      General: No focal deficit " present.      Mental Status: She is alert and oriented to person, place, and time.   Psychiatric:         Mood and Affect: Mood normal.         Behavior: Behavior normal.         Thought Content: Thought content normal.         Judgment: Judgment normal.         Administrative Statements   I have spent a total time of 10 minutes in caring for this patient on the day of the visit/encounter including Instructions for management, Impressions, Counseling / Coordination of care, Documenting in the medical record, Reviewing / ordering tests, medicine, procedures  , and Obtaining or reviewing history  .

## 2025-04-24 DIAGNOSIS — F41.1 GENERALIZED ANXIETY DISORDER: ICD-10-CM

## 2025-04-24 RX ORDER — BUSPIRONE HYDROCHLORIDE 10 MG/1
10 TABLET ORAL DAILY
Qty: 90 TABLET | Refills: 1 | Status: SHIPPED | OUTPATIENT
Start: 2025-04-24

## 2025-05-16 DIAGNOSIS — F31.76 BIPOLAR DISORDER, IN FULL REMISSION, MOST RECENT EPISODE DEPRESSED (HCC): ICD-10-CM

## 2025-05-16 RX ORDER — LAMOTRIGINE 150 MG/1
150 TABLET ORAL DAILY
Qty: 90 TABLET | Refills: 0 | Status: SHIPPED | OUTPATIENT
Start: 2025-05-16

## 2025-08-18 ENCOUNTER — DOCUMENTATION (OUTPATIENT)
Dept: ADMINISTRATIVE | Facility: OTHER | Age: 25
End: 2025-08-18